# Patient Record
Sex: FEMALE | Race: BLACK OR AFRICAN AMERICAN | NOT HISPANIC OR LATINO | Employment: UNEMPLOYED | URBAN - METROPOLITAN AREA
[De-identification: names, ages, dates, MRNs, and addresses within clinical notes are randomized per-mention and may not be internally consistent; named-entity substitution may affect disease eponyms.]

---

## 2021-05-22 ENCOUNTER — HOSPITAL ENCOUNTER (EMERGENCY)
Facility: HOSPITAL | Age: 26
Discharge: HOME | End: 2021-05-22
Attending: FAMILY MEDICINE
Payer: MEDICAID

## 2021-05-22 VITALS
HEART RATE: 106 BPM | SYSTOLIC BLOOD PRESSURE: 115 MMHG | HEIGHT: 68 IN | WEIGHT: 154.88 LBS | DIASTOLIC BLOOD PRESSURE: 80 MMHG | OXYGEN SATURATION: 100 % | RESPIRATION RATE: 20 BRPM | BODY MASS INDEX: 23.47 KG/M2 | TEMPERATURE: 99 F

## 2021-05-22 DIAGNOSIS — J06.9 VIRAL URI WITH COUGH: Primary | ICD-10-CM

## 2021-05-22 PROCEDURE — 99284 EMERGENCY DEPT VISIT MOD MDM: CPT | Mod: 25

## 2021-05-22 PROCEDURE — 96372 THER/PROPH/DIAG INJ SC/IM: CPT | Mod: 59

## 2021-05-22 PROCEDURE — 63600175 PHARM REV CODE 636 W HCPCS: Performed by: REGISTERED NURSE

## 2021-05-22 RX ORDER — PREDNISONE 20 MG/1
40 TABLET ORAL DAILY
Qty: 10 TABLET | Refills: 0 | Status: SHIPPED | OUTPATIENT
Start: 2021-05-22 | End: 2021-05-27

## 2021-05-22 RX ORDER — DEXAMETHASONE SODIUM PHOSPHATE 4 MG/ML
8 INJECTION, SOLUTION INTRA-ARTICULAR; INTRALESIONAL; INTRAMUSCULAR; INTRAVENOUS; SOFT TISSUE
Status: COMPLETED | OUTPATIENT
Start: 2021-05-22 | End: 2021-05-22

## 2021-05-22 RX ORDER — ALBUTEROL SULFATE 90 UG/1
1-2 AEROSOL, METERED RESPIRATORY (INHALATION) EVERY 6 HOURS PRN
Qty: 8 G | Refills: 0 | Status: SHIPPED | OUTPATIENT
Start: 2021-05-22 | End: 2022-05-22

## 2021-05-22 RX ORDER — BENZONATATE 100 MG/1
100 CAPSULE ORAL 3 TIMES DAILY PRN
Qty: 20 CAPSULE | Refills: 0 | Status: SHIPPED | OUTPATIENT
Start: 2021-05-22 | End: 2021-06-01

## 2021-05-22 RX ADMIN — DEXAMETHASONE SODIUM PHOSPHATE 8 MG: 4 INJECTION, SOLUTION INTRA-ARTICULAR; INTRALESIONAL; INTRAMUSCULAR; INTRAVENOUS; SOFT TISSUE at 10:05

## 2023-12-06 ENCOUNTER — TELEPHONE (OUTPATIENT)
Dept: OBSTETRICS AND GYNECOLOGY | Facility: CLINIC | Age: 28
End: 2023-12-06
Payer: MEDICAID

## 2023-12-06 NOTE — TELEPHONE ENCOUNTER
Called patient and advised new gyn Medicaid appointments are very limited and are booked until the end of 2024 or later.  Patient advised to try [facility] at [phone], [facility] at [phone] or [facility] at [phone].  Patient verbalized understanding.

## 2023-12-06 NOTE — TELEPHONE ENCOUNTER
----- Message from Moustapha Restrepo sent at 12/6/2023 11:18 AM CST -----  Contact: vitor  Patient is requesting a call to schedule an visit for birth control removal and reinsert of a different brand. Please call her back at [phone].      Thanks  DD